# Patient Record
Sex: FEMALE | Race: WHITE
[De-identification: names, ages, dates, MRNs, and addresses within clinical notes are randomized per-mention and may not be internally consistent; named-entity substitution may affect disease eponyms.]

---

## 2019-03-12 ENCOUNTER — HOSPITAL ENCOUNTER (EMERGENCY)
Dept: HOSPITAL 10 - E/R | Age: 79
Discharge: HOME | End: 2019-03-12
Payer: MEDICARE

## 2019-03-12 ENCOUNTER — HOSPITAL ENCOUNTER (EMERGENCY)
Dept: HOSPITAL 91 - E/R | Age: 79
Discharge: HOME | End: 2019-03-12
Payer: MEDICARE

## 2019-03-12 VITALS
WEIGHT: 191.8 LBS | BODY MASS INDEX: 31.96 KG/M2 | WEIGHT: 191.8 LBS | BODY MASS INDEX: 31.96 KG/M2 | HEIGHT: 65 IN | HEIGHT: 65 IN

## 2019-03-12 VITALS — RESPIRATION RATE: 20 BRPM | SYSTOLIC BLOOD PRESSURE: 102 MMHG | DIASTOLIC BLOOD PRESSURE: 45 MMHG | HEART RATE: 59 BPM

## 2019-03-12 DIAGNOSIS — Z79.84: ICD-10-CM

## 2019-03-12 DIAGNOSIS — R61: ICD-10-CM

## 2019-03-12 DIAGNOSIS — I10: ICD-10-CM

## 2019-03-12 DIAGNOSIS — Z79.82: ICD-10-CM

## 2019-03-12 DIAGNOSIS — M25.521: Primary | ICD-10-CM

## 2019-03-12 LAB
ADD MAN DIFF?: NO
ALANINE AMINOTRANSFERASE: 30 IU/L (ref 13–69)
ALBUMIN/GLOBULIN RATIO: 1.34
ALBUMIN: 4.3 G/DL (ref 3.3–4.9)
ALKALINE PHOSPHATASE: 54 IU/L (ref 42–121)
ANION GAP: 13 (ref 5–13)
ASPARTATE AMINO TRANSFERASE: 27 IU/L (ref 15–46)
BASOPHIL #: 0.1 10^3/UL (ref 0–0.1)
BASOPHILS %: 0.6 % (ref 0–2)
BILIRUBIN,DIRECT: 0 MG/DL (ref 0–0.2)
BILIRUBIN,TOTAL: 0.2 MG/DL (ref 0.2–1.3)
BLOOD UREA NITROGEN: 29 MG/DL (ref 7–20)
CALCIUM: 9.6 MG/DL (ref 8.4–10.2)
CARBON DIOXIDE: 26 MMOL/L (ref 21–31)
CHLORIDE: 101 MMOL/L (ref 97–110)
CREATININE: 1.19 MG/DL (ref 0.44–1)
EOSINOPHILS #: 0.2 10^3/UL (ref 0–0.5)
EOSINOPHILS %: 2.7 % (ref 0–7)
FREE T4 (FREE THYROXINE): 1.23 NG/DL (ref 0.85–1.93)
GLOBULIN: 3.2 G/DL (ref 1.3–3.2)
GLUCOSE: 149 MG/DL (ref 70–220)
HEMATOCRIT: 42.9 % (ref 37–47)
HEMOGLOBIN: 13.8 G/DL (ref 12–16)
IMMATURE GRANS #M: 0.05 10^3/UL (ref 0–0.03)
IMMATURE GRANS % (M): 0.6 % (ref 0–0.43)
LYMPHOCYTES #: 1.2 10^3/UL (ref 0.8–2.9)
LYMPHOCYTES %: 15.4 % (ref 15–51)
MEAN CORPUSCULAR HEMOGLOBIN: 29.9 PG (ref 29–33)
MEAN CORPUSCULAR HGB CONC: 32.2 G/DL (ref 32–37)
MEAN CORPUSCULAR VOLUME: 92.9 FL (ref 82–101)
MEAN PLATELET VOLUME: 11.4 FL (ref 7.4–10.4)
MONOCYTE #: 0.5 10^3/UL (ref 0.3–0.9)
MONOCYTES %: 6.4 % (ref 0–11)
NEUTROPHIL #: 5.9 10^3/UL (ref 1.6–7.5)
NEUTROPHILS %: 74.3 % (ref 39–77)
NUCLEATED RED BLOOD CELLS #: 0 10^3/UL (ref 0–0)
NUCLEATED RED BLOOD CELLS%: 0 /100WBC (ref 0–0)
PLATELET COUNT: 174 10^3/UL (ref 140–415)
POTASSIUM: 4.7 MMOL/L (ref 3.5–5.1)
RED BLOOD COUNT: 4.62 10^6/UL (ref 4.2–5.4)
RED CELL DISTRIBUTION WIDTH: 12.6 % (ref 11.5–14.5)
SODIUM: 140 MMOL/L (ref 135–144)
THYROID STIMULATING HORMONE: 2.42 MIU/L (ref 0.47–4.68)
TOTAL PROTEIN: 7.5 G/DL (ref 6.1–8.1)
TROPONIN-I: < 0.012 NG/ML (ref 0–0.12)
WHITE BLOOD COUNT: 7.9 10^3/UL (ref 4.8–10.8)

## 2019-03-12 PROCEDURE — 84439 ASSAY OF FREE THYROXINE: CPT

## 2019-03-12 PROCEDURE — 36415 COLL VENOUS BLD VENIPUNCTURE: CPT

## 2019-03-12 PROCEDURE — 85025 COMPLETE CBC W/AUTO DIFF WBC: CPT

## 2019-03-12 PROCEDURE — 99285 EMERGENCY DEPT VISIT HI MDM: CPT

## 2019-03-12 PROCEDURE — 73080 X-RAY EXAM OF ELBOW: CPT

## 2019-03-12 PROCEDURE — 71045 X-RAY EXAM CHEST 1 VIEW: CPT

## 2019-03-12 PROCEDURE — 84484 ASSAY OF TROPONIN QUANT: CPT

## 2019-03-12 PROCEDURE — 93005 ELECTROCARDIOGRAM TRACING: CPT

## 2019-03-12 PROCEDURE — 80053 COMPREHEN METABOLIC PANEL: CPT

## 2019-03-12 PROCEDURE — 84443 ASSAY THYROID STIM HORMONE: CPT

## 2019-03-12 NOTE — ERD
ER Documentation


Chief Complaint


Chief Complaint





Right elbow pain, sweating





HPI


This is a 79-year-old female who is here because she has right elbow pain for 


the past 6 months that occurs off and on described as a burning pain is mostly 


occurring when she tries to be in the elbow when she is lifting a glass of water


or trying to cook.  No chest pain or shortness of breath diaphoresis jaw pain 


dizziness.  She is also complaining of sweating a lot.  She says she breaks out 


in a sweat multiple times a day for the past couple of years.  She says she went


to her doctor and they told her everything was fine.  There is that her thyroid 


was normal.  She is here because she wants to check some basic labs and 


specifically to check out her lungs for some reason.  When asked about why she 


just shrugs her shoulders and says just to be sure





ROS


All systems reviewed and are negative except as per history of present illness.





Medications


Home Meds


Active Scripts


Tramadol HCl (Tramadol HCl) 50 Mg Tablet, 50 MG PO Q6, #20 TAB


   Prov:KAREN ALLEN DO         3/12/19


Ibuprofen* (Motrin*) 600 Mg Tab, 600 MG PO Q8, #30 TAB


   Prov:KAREN ALLEN DO         3/12/19


Reported Medications


Enalapril Maleate* (Enalapril Maleate*) 20 Mg Tablet, 20 MG PO DAILY, TAB


   3/12/19


Hydrochlorothiazide* (Hydrochlorothiazide*) 25 Mg Tab, 25 MG PO DAILY, #30 TAB


   3/12/19


Metformin Hcl* (Metformin Hcl*) 500 Mg Tablet, 500 MG PO WITH BREAKFAST DINNE, 


#60 TAB


   3/12/19


Atenolol* (Atenolol*) 25 Mg Tablet, 25 MG PO DAILY, #30 TAB


   3/12/19


Aspirin (Low Dose Aspirin) 81 Mg Tablet.dr, 81 MG PO DAILY, #30 TAB


   3/12/19


Discontinued Reported Medications


Esomeprazole Magnesium (Esomeprazole Magnesium) 40 Mg Capsule., 40 MG PO DAILY


   3/16/16


Lipase-Protease-Amylase* (Joseluis MARTINEZ* 36,000) 36,000 L-114,000-180,000 Unit 


Capsule., 1 CAP PO WITH MEALS, CAP


   3/16/16


Lubiprostone* (Amitiza*) 24 Mcg Capsule, 24 MCG PO BID, #60 CAP


   3/16/16


Mirtazapine* (Mirtazapine* ODT) 15 Mg/Udtablet Tab.rapdis, 15 MG PO QHS


   3/16/16


Lansoprazole* (Lansoprazole*) 30 Mg Capsule.dr, 30 MG PO DAILY


   3/16/16


Hydrocortisone* Rectal (Protozone-HC*) 30 Gm Cr, #30


   3/16/16


Fluticasone Propionate* (Fluticasone Propionate* Nasal) 50 Mcg/Spray - 16 Gm 


Blevins.susp, 1 SPRAY NASAL DAILY, #1 BOTTLE


   TO EACH NOSTRIL


   3/16/16


Diclofenac Sodium* (Voltaren* Gel) 1% -100 Gm Gel, #100


   3/16/16


Ramipril (Ramipril) 10 Mg Capsule, 10 MG PO BID, CAP


   3/16/16


Ibuprofen* (Ibuprofen*) 600 Mg Tablet, 600 MG PO BID PRN for PAIN


   3/16/16


Hydrochlorothiazide* (Hydrochlorothiazide*) 25 Mg Tab, 25 MG PO DAILY


   3/16/16


Triamcinolone Acetonide* (Kenalog*) 0.1%-15GM Cr, #80


   3/16/16


Aspirin* (Aspirin* Chew) 81 Mg Tab.chew, 81 MG PO DAILY, TAB.CHEW


   6/23/15


Atenolol* (Atenolol*) 50 Mg Tablet, 50 MG PO DAILY, TAB


   10/11/14





Allergies


Allergies:  


Coded Allergies:  


     No Known Allergy (Unverified , 3/12/19)





PMhx/Soc


History of Surgery:  Yes (CHOLECYSTECTOMY, LEFT HIP REPLACEMENT)


Anesthesia Reaction:  No


Hx Neurological Disorder:  No


Hx Respiratory Disorders:  No


Hx Cardiac Disorders:  Yes (HTN)


Hx Psychiatric Problems:  No


Hx Miscellaneous Medical Probl:  No (KIDNEY STONES)


Hx Alcohol Use:  No


Hx Substance Use:  No


Hx Tobacco Use:  No


Smoking Status:  Never smoker





FmHx


Family History:  No coronary disease





Physical Exam


Vitals





Vital Signs


  Date      Temp  Pulse  Resp  B/P (MAP)   Pulse Ox  O2          O2 Flow    FiO2


Time                                                 Delivery    Rate


   3/12/19  98.6     60    18      142/62        99


     09:58                           (88)





Physical Exam


 Const:      Well-developed, well-nourished


 Head:        Atraumatic, normocephalic


 Eyes:       Normal Conjunctiva, PERRLA, EOMI, normal sclera, no nystagmus


 ENT:         Normal External Ears, Nose and Mouth, moist mucus membranes.


 Neck:        Full range of motion.  No meningismus, no lymphadenopathy.


 Resp:         Clear to auscultation bilaterally, no wheezing, rhonchi, rales


 Cardio:       Regular rate and rhythm, no murmurs, S1 S2 present


 Abd:         Soft, non tender x 4, non distended. Normal bowel sounds, no 


guarding or rebound, no pulsitile abdominal masses or bruits


 Skin:         No petechiae or rashes, no ecchymosis , no maculopapular rash


 Back:        No midline or flank tenderness


 Ext:          No cyanosis, or edema, FROM x 4, normal inspection, 


neurovascularly intact x 4


 Neur:        Awake and alert, STR 5/5 x 4, sensation intact x 4, no focal 


findings, cerebellum intact


 Psych:        Normal Mood and Affect


Result Diagram:  


3/12/19 1050                                                                    


           3/12/19 1050





Results 24 hrs





Laboratory Tests


              Test
                                 3/12/19
10:50


              White Blood Count                      7.9 10^3/ul


              Red Blood Count                       4.62 10^6/ul


              Hemoglobin                               13.8 g/dl


              Hematocrit                                  42.9 %


              Mean Corpuscular Volume                    92.9 fl


              Mean Corpuscular Hemoglobin                29.9 pg


              Mean Corpuscular Hemoglobin
Concent     32.2 g/dl 



              Red Cell Distribution Width                 12.6 %


              Platelet Count                         174 10^3/UL


              Mean Platelet Volume                       11.4 fl


              Immature Granulocytes %                    0.600 %


              Neutrophils %                               74.3 %


              Lymphocytes %                               15.4 %


              Monocytes %                                  6.4 %


              Eosinophils %                                2.7 %


              Basophils %                                  0.6 %


              Nucleated Red Blood Cells %            0.0 /100WBC


              Immature Granulocytes #              0.050 10^3/ul


              Neutrophils #                          5.9 10^3/ul


              Lymphocytes #                          1.2 10^3/ul


              Monocytes #                            0.5 10^3/ul


              Eosinophils #                          0.2 10^3/ul


              Basophils #                            0.1 10^3/ul


              Nucleated Red Blood Cells #            0.0 10^3/ul


              Sodium Level                            140 mmol/L


              Potassium Level                         4.7 mmol/L


              Chloride Level                          101 mmol/L


              Carbon Dioxide Level                     26 mmol/L


              Anion Gap                                       13


              Blood Urea Nitrogen                       29 mg/dl


              Creatinine                              1.19 mg/dl


              Est Glomerular Filtrat Rate
mL/min    mL/min 



              Glucose Level                            149 mg/dl


              Calcium Level                            9.6 mg/dl


              Total Bilirubin                          0.2 mg/dl


              Direct Bilirubin                        0.00 mg/dl


              Indirect Bilirubin                       0.2 mg/dl


              Aspartate Amino Transf
(AST/SGOT)         27 IU/L 



              Alanine Aminotransferase
(ALT/SGPT)       30 IU/L 



              Alkaline Phosphatase                       54 IU/L


              Troponin I                           < 0.012 ng/ml


              Total Protein                             7.5 g/dl


              Albumin                                   4.3 g/dl


              Globulin                                 3.20 g/dl


              Albumin/Globulin Ratio                        1.34


              Thyroid Stimulating Hormone
(TSH)     2.420 MIU/L 



              Free Thyroxine                          1.23 ng/dl








Procedures/MDM


Ordering MD: KAREN ALLEN DO   Location:  [facility]   Room/Bed:                


                           


                                        


PROCEDURE:   XR chest. 


 


CLINICAL INDICATION:  Chest pain


 


TECHNIQUE:   A single portable view of the chest was obtained.  


 


COMPARISON:   07/22/2015 


 


FINDINGS:


 


The lungs are clear. There is no pleural effusion or pneumothorax. The cardiac 


and mediastinal contours are within normal limits. The aorta is atherosclerotic.


 


IMPRESSION:


 


1.  No acute pulmonary abnormality.


 


 


 


RPTAT: AAEE


_____________________________________________ 


Physician Gerda           Date    Time 


Electronically viewed and signed by Nima Leach Physician on 03/12/2019 


11:26 


 


D:  03/12/2019 11:26  T:  03/12/2019 11:26


RF/





CC: KAREN ALLEN DO





[id number]




















       MR #:    [id number]   Acct #:   [id number]    DOS: 03/12/19 1036


Ordering MD: KAREN ALLEN DO   Location:  [facility]   Room/Bed:                


                           


                                        


PROCEDURE:   XR right Elbow. 


 


CLINICAL INDICATION:   Pain


 


TECHNIQUE:   AP, lateral and oblique  views of the right elbow were obtained. 


 


COMPARISON:   None. 


 


FINDINGS:


 


There is no acute fracture or dislocation. The bones are demineralized. Joint 


spaces are maintained. There is normal bone mineralization and alignment. The 


soft tissues are unremarkable. 


 


IMPRESSION:


 


1.  Unremarkable right elbow radiographs.


 


 


RPTAT: AAEE


_____________________________________________ 


Nima Leach Physician           Date    Time 


Electronically viewed and signed by Nima Leach Physician on 03/12/2019 


11:30 


 


D:  03/12/2019 11:30  T:  03/12/2019 11:30


RF/





CC: KAREN ALLEN DO





[id number]














Patient's labs look good.  No signs of arthritis on her x-rays she also has no 


signs of infection kidney or liver issues.  The hyperhidrosis is uncertain at 


this time.





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





Departure


Diagnosis:  


   Primary Impression:  


   Hyperhidrosis


   Additional Impression:  


   Arthritic-like pain


   Joint pain location:  elbow  Laterality:  right  Qualified Codes:  M25.521 - 


   Pain in right elbow


Condition:  Stable


Patient Instructions:  Arthralgia











KAREN ALLEN DO         Mar 12, 2019 12:27